# Patient Record
Sex: FEMALE | Race: WHITE | NOT HISPANIC OR LATINO
[De-identification: names, ages, dates, MRNs, and addresses within clinical notes are randomized per-mention and may not be internally consistent; named-entity substitution may affect disease eponyms.]

---

## 2021-12-28 ENCOUNTER — TRANSCRIPTION ENCOUNTER (OUTPATIENT)
Age: 51
End: 2021-12-28

## 2022-08-22 ENCOUNTER — APPOINTMENT (OUTPATIENT)
Dept: OTOLARYNGOLOGY | Facility: CLINIC | Age: 52
End: 2022-08-22

## 2022-09-01 ENCOUNTER — NON-APPOINTMENT (OUTPATIENT)
Age: 52
End: 2022-09-01

## 2022-09-05 ENCOUNTER — NON-APPOINTMENT (OUTPATIENT)
Age: 52
End: 2022-09-05

## 2022-11-30 PROBLEM — Z00.00 ENCOUNTER FOR PREVENTIVE HEALTH EXAMINATION: Status: ACTIVE | Noted: 2022-11-30

## 2022-12-09 ENCOUNTER — APPOINTMENT (OUTPATIENT)
Dept: ORTHOPEDIC SURGERY | Facility: CLINIC | Age: 52
End: 2022-12-09

## 2022-12-09 VITALS — BODY MASS INDEX: 33.99 KG/M2 | HEIGHT: 61 IN | WEIGHT: 180 LBS

## 2022-12-09 DIAGNOSIS — Z63.5 DISRUPTION OF FAMILY BY SEPARATION AND DIVORCE: ICD-10-CM

## 2022-12-09 DIAGNOSIS — Z78.9 OTHER SPECIFIED HEALTH STATUS: ICD-10-CM

## 2022-12-09 DIAGNOSIS — I10 ESSENTIAL (PRIMARY) HYPERTENSION: ICD-10-CM

## 2022-12-09 PROCEDURE — 99204 OFFICE O/P NEW MOD 45 MIN: CPT

## 2022-12-09 SDOH — SOCIAL STABILITY - SOCIAL INSECURITY: DISRUPTION OF FAMILY BY SEPARATION AND DIVORCE: Z63.5

## 2022-12-09 NOTE — PHYSICAL EXAM
[Flexion] : flexion [Extension] : extension [Normal Coordination] : normal coordination [Normal DTR UE/LE] : normal DTR UE/LE  [Normal Sensation] : normal sensation [Normal Mood and Affect] : normal mood and affect [Orientated] : orientated [Able to Communicate] : able to communicate [Normal Skin] : normal skin [No Rash] : no rash [No Ulcers] : no ulcers [No Lesions] : no lesions [No obvious lymphadenopathy in areas examined] : no obvious lymphadenopathy in areas examined [Well Developed] : well developed [Well Nourished] : well nourished [Peripheral vascular exam is grossly normal] : peripheral vascular exam is grossly normal [No Respiratory Distress] : no respiratory distress [de-identified] : Constitutional:\par - General Appearance:\par Unremarkable\par Body Habitus\par Well Developed\par Well Nourished\par Body Habitus\par No Deformities\par Well Groomed\par Ability To communicate:\par Normal\par Neurologic:\par Global sensation is intact to upper and lower extremities. See examination of Neck and/or Spine\par for exceptions.\par Orientation to Time, Place and Person is: Normal\par Mood And Affect is Normal\par Skin:\par - Head/Face, Right Upper/Lower Extremity, Left Upper/Lower Extremity: Normal\par See Examination of Neck and/or Spine for exceptions\par Cardiovascular:\par Peripheral Cardiovascular System is Normal\par Palpation of Lymph Nodes:\par Normal Palpation of lymph nodes in: Axilla, Cervical, Inguinal\par Abnormal Palpation of lymph nodes in: None  [] : non-antalgic [de-identified] : buttock pain with left SLR [TWNoteComboBox7] : forward flexion 30 degrees [de-identified] : extension 5 degrees

## 2022-12-09 NOTE — DISCUSSION/SUMMARY
[de-identified] : 51 y/o female with acute lumbar myofascial strain and chronic spondylolisthesis L5-S1. Discussed conservative treatments in the form of OTC NSAIDs, core strengthening exercises, and physical therapy. Patient was provided with a referral for lumbar physical therapy to work on stretching, strengthening and range of motion. Discussed possible interventional spine injections vs surgical decompression/fusion dependent upon her response to conservative treatments, although I am optimistic she will respond to PT and medications. 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 Patient will stay OOW. Note given. F/U in 5-6 weeks. \par \par Prior to appointment and during encounter with patient extensive medical records were reviewed including but not limited to, hospital records, outpatient records, imaging results, and lab data.During this appointment the patient was examined, diagnoses were discussed and explained in a face to face manner. In addition extensive time was spent reviewing aforementioned diagnostic studies. Counseling including abnormal image results, differential diagnoses, treatment options, risk and benefits, lifestyle changes, current condition, and current medications was performed. Patient's comments, questions, and concerns were addressed and patient verbalized understanding. Based on this patient's presentation at our office, which is an orthopedic spine surgeon's office, this patient inherently / intrinsically has a risk, however minute, of developing issues such as Cauda equina syndrome, bowel and bladder changes, or progression of motor or neurological deficits such as paralysis which may be permanent.\par \par EVITA GILBERT Acting as a Scribe for Evita Rios, attest that this documentation has been prepared under the direction and in the presence of Provider Brandt Kauffman MD.

## 2022-12-09 NOTE — HISTORY OF PRESENT ILLNESS
[Sudden] : sudden [6] : 6 [5] : 5 [Dull/Aching] : dull/aching [Radiating] : radiating [Sharp] : sharp [Shooting] : shooting [Stabbing] : stabbing [Tightness] : tightness [Constant] : constant [Heat] : heat [Not working due to injury] : Work status: not working due to injury [de-identified] : 12/09/2022 - 51 y/o female presenting for an initial evaluation of lumbar. Onset of pain approximately 1 month ago after lifting [place] tree. No hx of lumbar issues. Chief complaint is focal low back pain and b/l hip pain.  No pain, numbness/tingling, or changes in strength in the lower extremities b/l. Treating with hot compresses, no formal PT or chiropractic care. Medicine wise, she is using muscle relaxer at nighttime prescribed by PCP,  she states no long standing relief. Within the last month, she states her symptoms are mildly improved but chronic pain present daily.  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

## 2023-01-13 ENCOUNTER — APPOINTMENT (OUTPATIENT)
Dept: ORTHOPEDIC SURGERY | Facility: CLINIC | Age: 53
End: 2023-01-13
Payer: COMMERCIAL

## 2023-01-13 VITALS — WEIGHT: 180 LBS | HEIGHT: 61 IN | BODY MASS INDEX: 33.99 KG/M2

## 2023-01-13 PROCEDURE — 99213 OFFICE O/P EST LOW 20 MIN: CPT

## 2023-01-15 NOTE — PHYSICAL EXAM
[Normal Coordination] : normal coordination [Normal DTR UE/LE] : normal DTR UE/LE  [Normal Sensation] : normal sensation [Normal Mood and Affect] : normal mood and affect [Orientated] : orientated [Able to Communicate] : able to communicate [Normal Skin] : normal skin [No Rash] : no rash [No Ulcers] : no ulcers [No Lesions] : no lesions [No obvious lymphadenopathy in areas examined] : no obvious lymphadenopathy in areas examined [Well Developed] : well developed [Well Nourished] : well nourished [Peripheral vascular exam is grossly normal] : peripheral vascular exam is grossly normal [No Respiratory Distress] : no respiratory distress [de-identified] : Constitutional:\par - General Appearance:\par Unremarkable\par Body Habitus\par Well Developed\par Well Nourished\par Body Habitus\par No Deformities\par Well Groomed\par Ability To communicate:\par Normal\par Neurologic:\par Global sensation is intact to upper and lower extremities. See examination of Neck and/or Spine\par for exceptions.\par Orientation to Time, Place and Person is: Normal\par Mood And Affect is Normal\par Skin:\par - Head/Face, Right Upper/Lower Extremity, Left Upper/Lower Extremity: Normal\par See Examination of Neck and/or Spine for exceptions\par Cardiovascular:\par Peripheral Cardiovascular System is Normal\par Palpation of Lymph Nodes:\par Normal Palpation of lymph nodes in: Axilla, Cervical, Inguinal\par Abnormal Palpation of lymph nodes in: None  [] : non-antalgic [de-identified] : notes discomfort with right SLR  [TWNoteComboBox7] : False [de-identified] : False

## 2023-01-15 NOTE — HISTORY OF PRESENT ILLNESS
[4] : 4 [1] : 2 [Sharp] : sharp [Stabbing] : stabbing [Intermittent] : intermittent [Physical therapy] : physical therapy [Not working due to injury] : Work status: not working due to injury [de-identified] : 01/13/2023 - 51 y/o female presenting for a follow up of lumbar spine. She reports significant improvements since her last visit transient from PT, time, and rest. chief complaint today is focal right sided low back pain. Not symptomatic enough to use medications for pain. \par \par 12/09/2022 - 51 y/o female presenting for an initial evaluation of lumbar. Onset of pain approximately 1 month ago after lifting [place] tree. No hx of lumbar issues. Chief complaint is focal low back pain and b/l hip pain.  No pain, numbness/tingling, or changes in strength in the lower extremities b/l. Treating with hot compresses, no formal PT or chiropractic care. Medicine wise, she is using muscle relaxer at nighttime prescribed by PCP,  she states no long standing relief. Within the last month, she states her symptoms are mildly improved but chronic pain present daily.  \par \par  [] : no [FreeTextEntry1] : L-spine [de-identified] : Pt stopped PT, she reports sxs are much better and would like to return back to work.

## 2023-01-15 NOTE — DISCUSSION/SUMMARY
[de-identified] : Discussed conservative treatments in the form of OTC NSAIDs, core strengthening exercises, and physical therapy. Patient was provided with a renewal for lumbar physical therapy to work on stretching, strengthening and range of motion. I advised the patient to incorporate the exercises their teaching during PT into her daily routine. Patient will RTW on 1/16/2023, work note provided. F/U in 6 weeks. \par \par Prior to appointment and during encounter with patient extensive medical records were reviewed including but not limited to, hospital records, outpatient records, imaging results, and lab data.During this appointment the patient was examined, diagnoses were discussed and explained in a face to face manner. In addition extensive time was spent reviewing aforementioned diagnostic studies. Counseling including abnormal image results, differential diagnoses, treatment options, risk and benefits, lifestyle changes, current condition, and current medications was performed. Patient's comments, questions, and concerns were addressed and patient verbalized understanding. Based on this patient's presentation at our office, which is an orthopedic spine surgeon's office, this patient inherently / intrinsically has a risk, however minute, of developing issues such as Cauda equina syndrome, bowel and bladder changes, or progression of motor or neurological deficits such as paralysis which may be permanent.\par \par EVITA ZUNIGA Acting as a Scribe for Dr. Langley I, Evita Zuniga, attest that this documentation has been prepared under the direction and in the presence of Provider Brandt Kauffman MD.

## 2023-01-16 ENCOUNTER — FORM ENCOUNTER (OUTPATIENT)
Age: 53
End: 2023-01-16

## 2023-03-31 ENCOUNTER — APPOINTMENT (OUTPATIENT)
Dept: ORTHOPEDIC SURGERY | Facility: CLINIC | Age: 53
End: 2023-03-31
Payer: COMMERCIAL

## 2023-03-31 VITALS — WEIGHT: 180 LBS | BODY MASS INDEX: 33.99 KG/M2 | HEIGHT: 61 IN

## 2023-03-31 DIAGNOSIS — S39.012A STRAIN OF MUSCLE, FASCIA AND TENDON OF LOWER BACK, INITIAL ENCOUNTER: ICD-10-CM

## 2023-03-31 PROCEDURE — 99213 OFFICE O/P EST LOW 20 MIN: CPT

## 2023-03-31 NOTE — HISTORY OF PRESENT ILLNESS
[de-identified] : 3/31/2023-  51 y/o female presenting for a follow up of lumbar spine.  Since last visit she started working again and her lumbar back pain has been more persistent.  patient c/o right lumbar back pain with radiculopathy in to her right buttock assoc. w/ paraesthesias to b/l lower legs. \par \par 01/13/2023 - 51 y/o female presenting for a follow up of lumbar spine. She reports significant improvements since her last visit transient from PT, time, and rest. chief complaint today is focal right sided low back pain. Not symptomatic enough to use medications for pain. \par \par 12/09/2022 - 51 y/o female presenting for an initial evaluation of lumbar. Onset of pain approximately 1 month ago after lifting [place] tree. No hx of lumbar issues. Chief complaint is focal low back pain and b/l hip pain. No pain, numbness/tingling, or changes in strength in the lower extremities b/l. Treating with hot compresses, no formal PT or chiropractic care. Medicine wise, she is using muscle relaxer at nighttime prescribed by PCP, she states no long standing relief. Within the last month, she states her symptoms are mildly improved but chronic pain present daily. \par \par  \par \par Injury Details: \par Location of Problem:. L-spine. \par On a scale of 5/10\par On a scale of 7/10\par Pain Quality: sharp, stabbing. \par Pain is Radiating: no. \par Pain is intermittent. \par Pain improves with: physical therapy. \par Patient needs support to ambulate: no. \par Patient has had physical therapy for this in the past: yes. \par Treatment Since Last Visit: Pt stopped PT\par Work Details: \par Work status: not working due to injury. \par

## 2023-03-31 NOTE — HISTORY OF PRESENT ILLNESS
[de-identified] : 3/31/2023-  51 y/o female presenting for a follow up of lumbar spine.  Since last visit she started working again and her lumbar back pain has been more persistent.  patient c/o right lumbar back pain with radiculopathy in to her right buttock assoc. w/ paraesthesias to b/l lower legs. \par \par 01/13/2023 - 51 y/o female presenting for a follow up of lumbar spine. She reports significant improvements since her last visit transient from PT, time, and rest. chief complaint today is focal right sided low back pain. Not symptomatic enough to use medications for pain. \par \par 12/09/2022 - 51 y/o female presenting for an initial evaluation of lumbar. Onset of pain approximately 1 month ago after lifting [place] tree. No hx of lumbar issues. Chief complaint is focal low back pain and b/l hip pain. No pain, numbness/tingling, or changes in strength in the lower extremities b/l. Treating with hot compresses, no formal PT or chiropractic care. Medicine wise, she is using muscle relaxer at nighttime prescribed by PCP, she states no long standing relief. Within the last month, she states her symptoms are mildly improved but chronic pain present daily. \par \par  \par \par Injury Details: \par Location of Problem:. L-spine. \par On a scale of 5/10\par On a scale of 7/10\par Pain Quality: sharp, stabbing. \par Pain is Radiating: no. \par Pain is intermittent. \par Pain improves with: physical therapy. \par Patient needs support to ambulate: no. \par Patient has had physical therapy for this in the past: yes. \par Treatment Since Last Visit: Pt stopped PT\par Work Details: \par Work status: not working due to injury. \par

## 2023-03-31 NOTE — DISCUSSION/SUMMARY
[de-identified] : Patient was advised to continue HEP given she cannot get to out patient PT d/t work scheduling.  Obtain MRI lumbar spine lumbar spine to r/o HNP given her pain has returned despite conservative tx methods.  Continue OTC nsaids PRN although she prefers not to use medication based therapies.  Continue heat application.  FUV for MRI review. Discussed possible interventional spine injections after mri.\par \par Prior to appointment and during encounter with patient extensive medical records were reviewed including but not limited to, hospital records, outpatient records, imaging results, and lab data.During this appointment the patient was examined, diagnoses were discussed and explained in a face to face manner. In addition extensive time was spent reviewing aforementioned diagnostic studies. Counseling including abnormal image results, differential diagnoses, treatment options, risk and benefits, lifestyle changes, current condition, and current medications was performed. Patient's comments, questions, and concerns were addressed and patient verbalized understanding. Based on this patient's presentation at our office, which is an orthopedic spine surgeon's office, this patient inherently / intrinsically has a risk, however minute, of developing issues such as Cauda equina syndrome, bowel and bladder changes, or progression of motor or neurological deficits such as paralysis which may be permanent.\par \par Jay AGUILA, personally performed the services described in this documentation incident to Salvador Haley DO.\par

## 2023-03-31 NOTE — PHYSICAL EXAM
[Normal Coordination] : normal coordination [Normal DTR UE/LE] : normal DTR UE/LE  [Normal Sensation] : normal sensation [Normal Mood and Affect] : normal mood and affect [Orientated] : orientated [Able to Communicate] : able to communicate [Normal Skin] : normal skin [No Rash] : no rash [No Ulcers] : no ulcers [No Lesions] : no lesions [No obvious lymphadenopathy in areas examined] : no obvious lymphadenopathy in areas examined [Well Developed] : well developed [Well Nourished] : well nourished [Peripheral vascular exam is grossly normal] : peripheral vascular exam is grossly normal [No Respiratory Distress] : no respiratory distress [Flexion] : flexion [Extension] : extension [de-identified] : Constitutional:\par - General Appearance:\par Unremarkable\par Body Habitus\par Well Developed\par Well Nourished\par Body Habitus\par No Deformities\par Well Groomed\par Ability To communicate:\par Normal\par Neurologic:\par Global sensation is intact to upper and lower extremities. See examination of Neck and/or Spine\par for exceptions.\par Orientation to Time, Place and Person is: Normal\par Mood And Affect is Normal\par Skin:\par - Head/Face, Right Upper/Lower Extremity, Left Upper/Lower Extremity: Normal\par See Examination of Neck and/or Spine for exceptions\par Cardiovascular:\par Peripheral Cardiovascular System is Normal\par Palpation of Lymph Nodes:\par Normal Palpation of lymph nodes in: Axilla, Cervical, Inguinal\par Abnormal Palpation of lymph nodes in: None  [] : non-antalgic [de-identified] : notes discomfort with right SLR  [de-identified] : paraesthesia noted to bilateral calf

## 2023-03-31 NOTE — PHYSICAL EXAM
[Normal Coordination] : normal coordination [Normal DTR UE/LE] : normal DTR UE/LE  [Normal Sensation] : normal sensation [Normal Mood and Affect] : normal mood and affect [Orientated] : orientated [Able to Communicate] : able to communicate [Normal Skin] : normal skin [No Rash] : no rash [No Ulcers] : no ulcers [No Lesions] : no lesions [No obvious lymphadenopathy in areas examined] : no obvious lymphadenopathy in areas examined [Well Developed] : well developed [Well Nourished] : well nourished [Peripheral vascular exam is grossly normal] : peripheral vascular exam is grossly normal [No Respiratory Distress] : no respiratory distress [Flexion] : flexion [Extension] : extension [de-identified] : Constitutional:\par - General Appearance:\par Unremarkable\par Body Habitus\par Well Developed\par Well Nourished\par Body Habitus\par No Deformities\par Well Groomed\par Ability To communicate:\par Normal\par Neurologic:\par Global sensation is intact to upper and lower extremities. See examination of Neck and/or Spine\par for exceptions.\par Orientation to Time, Place and Person is: Normal\par Mood And Affect is Normal\par Skin:\par - Head/Face, Right Upper/Lower Extremity, Left Upper/Lower Extremity: Normal\par See Examination of Neck and/or Spine for exceptions\par Cardiovascular:\par Peripheral Cardiovascular System is Normal\par Palpation of Lymph Nodes:\par Normal Palpation of lymph nodes in: Axilla, Cervical, Inguinal\par Abnormal Palpation of lymph nodes in: None  [] : non-antalgic [de-identified] : notes discomfort with right SLR  [de-identified] : paraesthesia noted to bilateral calf

## 2023-03-31 NOTE — DISCUSSION/SUMMARY
[de-identified] : Patient was advised to continue HEP given she cannot get to out patient PT d/t work scheduling.  Obtain MRI lumbar spine lumbar spine to r/o HNP given her pain has returned despite conservative tx methods.  Continue OTC nsaids PRN although she prefers not to use medication based therapies.  Continue heat application.  FUV for MRI review. Discussed possible interventional spine injections after mri.\par \par Prior to appointment and during encounter with patient extensive medical records were reviewed including but not limited to, hospital records, outpatient records, imaging results, and lab data.During this appointment the patient was examined, diagnoses were discussed and explained in a face to face manner. In addition extensive time was spent reviewing aforementioned diagnostic studies. Counseling including abnormal image results, differential diagnoses, treatment options, risk and benefits, lifestyle changes, current condition, and current medications was performed. Patient's comments, questions, and concerns were addressed and patient verbalized understanding. Based on this patient's presentation at our office, which is an orthopedic spine surgeon's office, this patient inherently / intrinsically has a risk, however minute, of developing issues such as Cauda equina syndrome, bowel and bladder changes, or progression of motor or neurological deficits such as paralysis which may be permanent.\par \par Jay AGUILA, personally performed the services described in this documentation incident to Salvador Haley DO.\par

## 2023-04-10 ENCOUNTER — RESULT REVIEW (OUTPATIENT)
Age: 53
End: 2023-04-10

## 2023-04-19 ENCOUNTER — APPOINTMENT (OUTPATIENT)
Dept: ORTHOPEDIC SURGERY | Facility: CLINIC | Age: 53
End: 2023-04-19
Payer: COMMERCIAL

## 2023-04-19 VITALS — HEIGHT: 61 IN | WEIGHT: 180 LBS | BODY MASS INDEX: 33.99 KG/M2

## 2023-04-19 PROCEDURE — 99214 OFFICE O/P EST MOD 30 MIN: CPT

## 2023-04-23 NOTE — PHYSICAL EXAM
[Normal Coordination] : normal coordination [Normal DTR UE/LE] : normal DTR UE/LE  [Normal Sensation] : normal sensation [Normal Mood and Affect] : normal mood and affect [Orientated] : orientated [Able to Communicate] : able to communicate [Normal Skin] : normal skin [No Rash] : no rash [No Ulcers] : no ulcers [No Lesions] : no lesions [No obvious lymphadenopathy in areas examined] : no obvious lymphadenopathy in areas examined [Well Developed] : well developed [Well Nourished] : well nourished [Peripheral vascular exam is grossly normal] : peripheral vascular exam is grossly normal [No Respiratory Distress] : no respiratory distress [Flexion] : flexion [Extension] : extension [de-identified] : Constitutional:\par - General Appearance:\par Unremarkable\par Body Habitus\par Well Developed\par Well Nourished\par Body Habitus\par No Deformities\par Well Groomed\par Ability To communicate:\par Normal\par Neurologic:\par Global sensation is intact to upper and lower extremities. See examination of Neck and/or Spine\par for exceptions.\par Orientation to Time, Place and Person is: Normal\par Mood And Affect is Normal\par Skin:\par - Head/Face, Right Upper/Lower Extremity, Left Upper/Lower Extremity: Normal\par See Examination of Neck and/or Spine for exceptions\par Cardiovascular:\par Peripheral Cardiovascular System is Normal\par Palpation of Lymph Nodes:\par Normal Palpation of lymph nodes in: Axilla, Cervical, Inguinal\par Abnormal Palpation of lymph nodes in: None  [FreeTextEntry9] : ROM cervical does not reproduce tingling in the left upper extremity  [] : non-antalgic [de-identified] : notes discomfort with right SLR  [de-identified] : paraesthesia noted to bilateral calf

## 2023-04-23 NOTE — HISTORY OF PRESENT ILLNESS
[Lower back] : lower back [7] : 7 [5] : 5 [Radiating] : radiating [Dull/Aching] : dull/aching [Shooting] : shooting [Constant] : constant [Nothing helps with pain getting better] : Nothing helps with pain getting better [Bending forward] : bending forward [Full time] : Work status: full time [de-identified] : 4/19/23: Patient presenting for an MRI follow up of L Spine. She reports aggravated symptoms recently. Patient c/o low back pain, b/l buttock/hip pain, and intermittent pain into the legs. Back pain > leg pain. Taking Aleve for symptom control and sitting on heating pad. She states formal PT did provide some relief in the past, but patient is not currently enrolled. She reports aggravated symptoms at work due to persistent back pain and symptoms in the left upper extremity. \par Cervical - Also C/o numbness in the left digits and subjective weakness in the upper extremity with certain tasks - symptoms present for a few weeks. \par \par 3/31/2023-  51 y/o female presenting for a follow up of lumbar spine.  Since last visit she started working again and her lumbar back pain has been more persistent.  patient c/o right lumbar back pain with radiculopathy in to her right buttock assoc. w/ paraesthesias to b/l lower legs. \par \par 01/13/2023 - 51 y/o female presenting for a follow up of lumbar spine. She reports significant improvements since her last visit transient from PT, time, and rest. chief complaint today is focal right sided low back pain. Not symptomatic enough to use medications for pain. \par \par 12/09/2022 - 51 y/o female presenting for an initial evaluation of lumbar. Onset of pain approximately 1 month ago after lifting [place] tree. No hx of lumbar issues. Chief complaint is focal low back pain and b/l hip pain. No pain, numbness/tingling, or changes in strength in the lower extremities b/l. Treating with hot compresses, no formal PT or chiropractic care. Medicine wise, she is using muscle relaxer at nighttime prescribed by PCP, she states no long standing relief. Within the last month, she states her symptoms are mildly improved but chronic pain present daily. \par \par  \par \par Injury Details: \par Location of Problem:. L-spine. \par On a scale of 5/10\par On a scale of 7/10\par Pain Quality: sharp, stabbing. \par Pain is Radiating: no. \par Pain is intermittent. \par Pain improves with: physical therapy. \par Patient needs support to ambulate: no. \par Patient has had physical therapy for this in the past: yes. \par Treatment Since Last Visit: Pt stopped PT\par Work Details: \par Work status: not working due to injury. \par  [] : no [FreeTextEntry7] : left>right [de-identified] : MRI at   [de-identified] : p

## 2023-04-23 NOTE — DISCUSSION/SUMMARY
[de-identified] : Discussed and reviewed results of L spine MRI from 4/10/23. Discussed conservative treatments in the form of Aleve, core strengthening exercises, and spinal injections. She was provided with a referral for PM consult to discuss intracept. I discussed with the patient that if she is refractory to conservative treatments then she is a candidate for decompression/fusion. Continue to exhaust conservative management at this time. Patient was provided with a referral for lumbar physical therapy to work on stretching, strengthening and range of motion. 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 Patient will stay OOW. Note given. Cervical MRI request in the future if symptoms persist. \par \par Prior to appointment and during encounter with patient extensive medical records were reviewed including but not limited to, hospital records, outpatient records, imaging results, and lab data.During this appointment the patient was examined, diagnoses were discussed and explained in a face to face manner. In addition extensive time was spent reviewing aforementioned diagnostic studies. Counseling including abnormal image results, differential diagnoses, treatment options, risk and benefits, lifestyle changes, current condition, and current medications was performed. Patient's comments, questions, and concerns were addressed and patient verbalized understanding. Based on this patient's presentation at our office, which is an orthopedic spine surgeon's office, this patient inherently / intrinsically has a risk, however minute, of developing issues such as Cauda equina syndrome, bowel and bladder changes, or progression of motor or neurological deficits such as paralysis which may be permanent.\par \par EVITA ZUNIGA Acting as a Scribe for Dr. Langley I, Evita Zuniga, attest that this documentation has been prepared under the direction and in the presence of Provider Brandt Kauffman MD.

## 2023-05-07 ENCOUNTER — FORM ENCOUNTER (OUTPATIENT)
Age: 53
End: 2023-05-07

## 2023-06-01 ENCOUNTER — APPOINTMENT (OUTPATIENT)
Dept: PAIN MANAGEMENT | Facility: CLINIC | Age: 53
End: 2023-06-01
Payer: COMMERCIAL

## 2023-06-01 VITALS — WEIGHT: 180 LBS | BODY MASS INDEX: 33.99 KG/M2 | HEIGHT: 61 IN

## 2023-06-01 PROCEDURE — 99204 OFFICE O/P NEW MOD 45 MIN: CPT

## 2023-06-14 ENCOUNTER — APPOINTMENT (OUTPATIENT)
Dept: ORTHOPEDIC SURGERY | Facility: CLINIC | Age: 53
End: 2023-06-14
Payer: COMMERCIAL

## 2023-06-14 VITALS — HEIGHT: 61 IN | BODY MASS INDEX: 33.99 KG/M2 | WEIGHT: 180 LBS

## 2023-06-14 PROCEDURE — 99214 OFFICE O/P EST MOD 30 MIN: CPT

## 2023-06-14 NOTE — PHYSICAL EXAM
[Normal Coordination] : normal coordination [Normal DTR UE/LE] : normal DTR UE/LE  [Normal Sensation] : normal sensation [Normal Mood and Affect] : normal mood and affect [Orientated] : orientated [Able to Communicate] : able to communicate [Normal Skin] : normal skin [No Rash] : no rash [No Ulcers] : no ulcers [No Lesions] : no lesions [No obvious lymphadenopathy in areas examined] : no obvious lymphadenopathy in areas examined [Well Developed] : well developed [Well Nourished] : well nourished [Peripheral vascular exam is grossly normal] : peripheral vascular exam is grossly normal [No Respiratory Distress] : no respiratory distress [Flexion] : flexion [Extension] : extension [de-identified] : Constitutional:\par - General Appearance:\par Unremarkable\par Body Habitus\par Well Developed\par Well Nourished\par Body Habitus\par No Deformities\par Well Groomed\par Ability To communicate:\par Normal\par Neurologic:\par Global sensation is intact to upper and lower extremities. See examination of Neck and/or Spine\par for exceptions.\par Orientation to Time, Place and Person is: Normal\par Mood And Affect is Normal\par Skin:\par - Head/Face, Right Upper/Lower Extremity, Left Upper/Lower Extremity: Normal\par See Examination of Neck and/or Spine for exceptions\par Cardiovascular:\par Peripheral Cardiovascular System is Normal\par Palpation of Lymph Nodes:\par Normal Palpation of lymph nodes in: Axilla, Cervical, Inguinal\par Abnormal Palpation of lymph nodes in: None  [] : non-antalgic [de-identified] : notes discomfort with right SLR  [de-identified] : paraesthesia noted to bilateral calf

## 2023-06-14 NOTE — HISTORY OF PRESENT ILLNESS
[Lower back] : lower back [7] : 7 [5] : 5 [Dull/Aching] : dull/aching [Radiating] : radiating [Shooting] : shooting [Constant] : constant [Nothing helps with pain getting better] : Nothing helps with pain getting better [Bending forward] : bending forward [Full time] : Work status: full time [de-identified] : 6/14/2023: Patient presenting in follow up. Scheduled for LESI with Dr. Haley next week. Symptoms remain the same. Chief complaints of both mid/low back and leg pain. Back pain worse than leg pain today, but severity switches day to day. Difficulty with standing and walking. LE strength intact, no neurologic changes. Actively completing home stretching exercises which are helpful. Not actively taking meds for pain. Compared to 6 wks ago, she is slightly improved. No cervical complaints are reported today. \par \par 4/19/23: Patient presenting for an MRI follow up of L Spine. She reports aggravated symptoms recently. Patient c/o low back pain, b/l buttock/hip pain, and intermittent pain into the legs. Back pain > leg pain. Taking Aleve for symptom control and sitting on heating pad. She states formal PT did provide some relief in the past, but patient is not currently enrolled. She reports aggravated symptoms at work due to persistent back pain and symptoms in the left upper extremity. \par Cervical - Also C/o numbness in the left digits and subjective weakness in the upper extremity with certain tasks - symptoms present for a few weeks. \par \par 3/31/2023-  51 y/o female presenting for a follow up of lumbar spine.  Since last visit she started working again and her lumbar back pain has been more persistent.  patient c/o right lumbar back pain with radiculopathy in to her right buttock assoc. w/ paraesthesias to b/l lower legs. \par \par 01/13/2023 - 51 y/o female presenting for a follow up of lumbar spine. She reports significant improvements since her last visit transient from PT, time, and rest. chief complaint today is focal right sided low back pain. Not symptomatic enough to use medications for pain. \par \par 12/09/2022 - 51 y/o female presenting for an initial evaluation of lumbar. Onset of pain approximately 1 month ago after lifting [place] tree. No hx of lumbar issues. Chief complaint is focal low back pain and b/l hip pain. No pain, numbness/tingling, or changes in strength in the lower extremities b/l. Treating with hot compresses, no formal PT or chiropractic care. Medicine wise, she is using muscle relaxer at nighttime prescribed by PCP, she states no long standing relief. Within the last month, she states her symptoms are mildly improved but chronic pain present daily. \par \par  \par \par Injury Details: \par Location of Problem:. L-spine. \par On a scale of 5/10\par On a scale of 7/10\par Pain Quality: sharp, stabbing. \par Pain is Radiating: no. \par Pain is intermittent. \par Pain improves with: physical therapy. \par Patient needs support to ambulate: no. \par Patient has had physical therapy for this in the past: yes. \par Treatment Since Last Visit: Pt stopped PT\par Work Details: \par Work status: not working due to injury. \par  [] : no [FreeTextEntry7] : left>right [de-identified] : MRI at

## 2023-06-14 NOTE — DISCUSSION/SUMMARY
[de-identified] : Discussed conservative treatments in the form of Aleve PRN, core strengthening exercises, and spinal injections. Patient will follow up for scheduled L5S1 b/l TFESI with Dr. Haley next week on 6/21/23. Possible surgical candidate for decompression/fusion, however, she seems to be trending in a less symptomatic direction with non operative measures. Continue to exhaust conservative management at this time. Discussed surgical fusion as a last resort measure if refractory to non operative mgmt and symptoms are functionally incapacity. Patient was advised to work on stretching, strengthening and range of motion. F/U 4 WKS. \par \par Prior to appointment and during encounter with patient extensive medical records were reviewed including but not limited to, hospital records, outpatient records, imaging results, and lab data.During this appointment the patient was examined, diagnoses were discussed and explained in a face to face manner. In addition extensive time was spent reviewing aforementioned diagnostic studies. Counseling including abnormal image results, differential diagnoses, treatment options, risk and benefits, lifestyle changes, current condition, and current medications was performed. Patient's comments, questions, and concerns were addressed and patient verbalized understanding. Based on this patient's presentation at our office, which is an orthopedic spine surgeon's office, this patient inherently / intrinsically has a risk, however minute, of developing issues such as Cauda equina syndrome, bowel and bladder changes, or progression of motor or neurological deficits such as paralysis which may be permanent.\par \par EVITA ZUNIGA Acting as a Scribe for Dr. Korin AGUILA, Evita Zuniga, attest that this documentation has been prepared under the direction and in the presence of Provider Brandt Kauffman MD.

## 2023-06-21 ENCOUNTER — APPOINTMENT (OUTPATIENT)
Dept: PAIN MANAGEMENT | Facility: CLINIC | Age: 53
End: 2023-06-21
Payer: COMMERCIAL

## 2023-06-21 PROCEDURE — 64483 NJX AA&/STRD TFRM EPI L/S 1: CPT | Mod: 59,RT

## 2023-06-21 NOTE — PROCEDURE
[FreeTextEntry3] : Date of Service: 06/21/2023 \par \par Account: [id number]\par \par Patient: WHITNEY STRAUSS \par \par YOB: 1970\par \par Age: 53 year \par \par Surgeon:      Salvador Haley DO\par \par Assistant:    None\par \par Pre-Operative Diagnosis:         Lumbosacral Radiculopathy                 \par \par Post Operative Diagnosis:       Lumbosacral Radiculopathy               \par \par Procedure:             Bilateral L5-S1 transforaminal epidural steroid injection under fluoroscopic guidance.\par \par Anesthesia: MAC\par \par This procedure was carried out using fluoroscopic guidance.  The risks and benefits of the procedure were discussed extensively with the patient.  The consent of the patient was obtained and the following procedure was performed.   A timeout was performed with all essential staff present and the site and side were verified.\par \par The right L5-S1 neural foramen was then identified on right oblique "tayler dog" anatomical view at the 6 o' clock position using fluoroscopic guidance, and the area was marked. The overlying skin and subcutaneous structures were anesthetized using sterile technique with 1% Lidocaine.   A 22 gauge 5 inch spinal needle was directed toward the inferior (6 o'clock) position of the pedicle, which formed the roof of the identified foramen.  Once in the epidural space, after negative aspiration for heme and CSF, 1cc of Omnipaque contrast was injected to confirm epidural location and assess filling defects and rule out intravascular needle placement. \par \par Lumbar epidurogram showed no intravascular or intrathecal flow pattern.  No blood or CSF was aspirated.  Omnipaque spread medially in epidural space and outlined the exiting nerve root.  After this, 2.5 cc of a mixture of 4 cc of preservative free normal saline plus 40 mg of Kenalog was injected in the epidural space.\par \par Then attention was focused to the left L5-S1 neural foramen. This was then identified on left oblique "tayler dog" anatomical view at the 6 o' clock position using fluoroscopic guidance, and the area was marked.  The overlying skin and subcutaneous structures were anesthetized using sterile technique with 1% Lidocaine.  A 22 gauge 5 inch spinal needle was directed toward the inferior (6 o'clock) position of the pedicle, which formed the roof of the identified foramen.  Once in the epidural space, after negative aspiration for heme and CSF, 1cc of Omnipaque contrast was injected to confirm epidural location and assess filling defects and rule out intravascular needle placement. \par \par The following contrast flow observed: no intravascular or intrathecal flow pattern was noted.  No blood or CSF was aspirated. Omnipaque spread medially in epidural space.  \par \par After this, the remainder of the injectate listed above was injected in the epidural space.\par \par Vital signs remained normal throughout the procedure.  The patient tolerated the procedure well.  There were no immediate complications from the performed procedure.  The patient was instructed to apply ice over the injection sites for twenty minutes every two hours for the next 24 hours.\par \par Disposition:\par      1.  The patient was advised to F/U in 1-2 weeks to assess the response to the injection.\par      2.  The patient was also instructed to contact me immediately if there were any concerns related to the procedure performed.

## 2023-06-22 ENCOUNTER — TRANSCRIPTION ENCOUNTER (OUTPATIENT)
Age: 53
End: 2023-06-22

## 2023-06-23 NOTE — ASSESSMENT
[FreeTextEntry1] : A discussion regarding available pain management treatment options occurred with the patient.  These included interventional, rehabilitative, pharmacological, and alternative modalities. We will proceed with the following:  \par \par Interventional treatment options:  \par - Proceed with bilateral L5-S1 TFESI with fluoroscopic guidance\par - May be candidate for L4-L5, L5-S1 BVN ablation for ongoing axial low back pain; informational materials provided and portal consents signed today\par - Caution given possible hemangioma on MRI; would require clarification of radiological interpretation \par - see additional instructions below  \par \par Rehabilitative options:    \par - continue physical therapy  \par - participation in active HEP was discussed  \par \par Medication based treatment options:\par - continue OTC NSAIDs on as-needed basis\par - see additional instructions below  \par \par Complementary treatment options:  \par - Weight management and lifestyle modifications discussed   \par \par Additional treatment recommendations as follows:  \par - Follow up 1-2 weeks post injection for assessment of efficacy and further treatment recommendations\par \par The risks, benefits and alternatives of the proposed procedure were explained in detail with the patient. The risks outlined include but are not limited to infection, bleeding, post- dural puncture headache, nerve injury, a temporary increase in pain, failure to resolve symptoms, allergic reaction, and possible elevation of blood sugar in diabetics if using corticosteroid.  All questions were answered to patient's apparent satisfaction and he/she verbalized an understanding.\par \par We have discussed the risks, benefits, and alternatives NSAID therapy including but not limited to the risk of bleeding, thrombosis, gastric mucosal irritation/ulceration, allergic reaction and kidney dysfunction; the patient verbalizes an understanding.\par \par The documentation recorded by the scribe, in my presence, accurately reflects the service I personally performed and the decisions made by me with my edits as appropriate. \par \par I, Hair Jaramillo acting as scribe, attest that this documentation has been prepared under the direction and in the presence of Provider Salvador Haley DO.

## 2023-06-23 NOTE — ADDENDUM
[FreeTextEntry1] : 1) There is evidence of Modic endplate changes at the L4-L5, L5-S1 vertebral levels indicative of vertebrogenic pain.\par 2) The patient has experienced greater than 6 months of chronic low back pain which has been refractory to conservative measures

## 2023-06-23 NOTE — PHYSICAL EXAM
[de-identified] : Constitutional:  \par - No acute distress  \par - Well developed; well nourished  \par \par Neurological:  \par - normal mood and affect  \par - alert and oriented x 3   \par \par Cardiovascular:  \par - grossly normal \par \par Lumbar Spine Exam: \par \par Inspection: \par erythema (-) \par ecchymosis (-) \par rashes (-) \par alignment: no scoliosis \par \par Palpation: \par Midline lumbar tenderness:            (-) \par midline thoracic tenderness:          (-) \par Lumbar paraspinal tenderness:  L (-) ; R (-) \par thoracic paraspinal tenderness: L (-) ; R (-) \par sciatic nerve tenderness :          L (-) ; R (-) \par SI joint tenderness:                     L (-) ; R (-) \par GTB tenderness:                        L (-);  R (-) \par \par ROM: WNL \par pain with forward flexion\par \par Strength: \par                                    Right       Left    \par Hip Flexion:                (5/5)       (5/5) \par Quadriceps:               (5/5)       (5/5) \par Hamstrings:                (5/5)       (5/5) \par Ankle Dorsiflexion:     (5/5)       (5/5) \par EHL:                           (5/5)       (5/5) \par Ankle Plantarflexion:  (5/5)       (5/5) \par \par Special Tests: \par SLR:                            R (EQ) ; L (+) \par Facet loading:             R (-) ; L (-) \par DEVIN test:                R (-) ; L (-) \par Hamstring tightness:   R (-);  L (-) \par \par Neurologic: \par SILT throughout right lower extremity \par SILT throughout left lower extremity \par \par Reflexes normal and symmetric bilateral lower extremities \par \par Gait: \par Mildly antalgic gait \par ambulates without assistive device

## 2023-06-23 NOTE — HISTORY OF PRESENT ILLNESS
[Lower back] : lower back [Sudden] : sudden [5] : 5 [Dull/Aching] : dull/aching [Intermittent] : intermittent [Physical therapy] : physical therapy [Walking] : walking [FreeTextEntry1] : The patient presents for initial evaluation regarding their low back pain.   Patient was referred by Dr. Kauffman.\par Patient injured herself lifting in November, she had no history of back pain prior to the injury. Her pain is across the lower back with radicular pain to the bilateral buttocks, and down the left leg accompanied by paraesthesias in the lower left leg; her pain is exacerbated by extended periods of sitting or walking/standing. Patient has done PT, and uses Aleve PRN with minimal benefit,\par \par Subjective weakness: No \par Lower extremity paresthesias: Yes\par Bladder/bowel dysfunction: No \par \par Injections: No \par \par Pertinent Surgical History: N/A \par \par Imaging: \par \par 1) MRI Lumbar Spine (4/10/2023) - [facility]\par L1-L2: There is no disc bulge, herniation, thecal sac compression or foraminal narrowing.\par L2-L3: There is no disc bulge, herniation, thecal sac compression or foraminal narrowing.\par L3-L4: There is no disc bulge, herniation, thecal sac compression or foraminal narrowing.\par L4-L5: Left paracentral/foraminal disc herniation. 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 There is severe spondylotic change with disc space narrowing. 3 mm anterolisthesis. There is right greater than left neural foraminal stenosis\par \par Physician Disclaimer: I have personally reviewed and confirmed all HPI data with the patient.  [] : Patient is currently injured and not playing sports: no [FreeTextEntry7] : buttock, hips and legs

## 2023-07-12 ENCOUNTER — APPOINTMENT (OUTPATIENT)
Dept: ORTHOPEDIC SURGERY | Facility: CLINIC | Age: 53
End: 2023-07-12
Payer: COMMERCIAL

## 2023-07-12 VITALS — HEIGHT: 61 IN | WEIGHT: 180 LBS | BODY MASS INDEX: 33.99 KG/M2

## 2023-07-12 DIAGNOSIS — Z78.9 OTHER SPECIFIED HEALTH STATUS: ICD-10-CM

## 2023-07-12 PROCEDURE — 99214 OFFICE O/P EST MOD 30 MIN: CPT

## 2023-07-14 NOTE — PHYSICAL EXAM
[Normal Coordination] : normal coordination [Normal DTR UE/LE] : normal DTR UE/LE  [Normal Sensation] : normal sensation [Normal Mood and Affect] : normal mood and affect [Orientated] : orientated [Able to Communicate] : able to communicate [Normal Skin] : normal skin [No Rash] : no rash [No Ulcers] : no ulcers [No Lesions] : no lesions [No obvious lymphadenopathy in areas examined] : no obvious lymphadenopathy in areas examined [Well Developed] : well developed [Well Nourished] : well nourished [Peripheral vascular exam is grossly normal] : peripheral vascular exam is grossly normal [No Respiratory Distress] : no respiratory distress [Flexion] : flexion [Extension] : extension [de-identified] : Constitutional:\par - General Appearance:\par Unremarkable\par Body Habitus\par Well Developed\par Well Nourished\par Body Habitus\par No Deformities\par Well Groomed\par Ability To communicate:\par Normal\par Neurologic:\par Global sensation is intact to upper and lower extremities. See examination of Neck and/or Spine\par for exceptions.\par Orientation to Time, Place and Person is: Normal\par Mood And Affect is Normal\par Skin:\par - Head/Face, Right Upper/Lower Extremity, Left Upper/Lower Extremity: Normal\par See Examination of Neck and/or Spine for exceptions\par Cardiovascular:\par Peripheral Cardiovascular System is Normal\par Palpation of Lymph Nodes:\par Normal Palpation of lymph nodes in: Axilla, Cervical, Inguinal\par Abnormal Palpation of lymph nodes in: None  [] : non-antalgic [de-identified] : paraesthesia noted to bilateral calf

## 2023-07-14 NOTE — DISCUSSION/SUMMARY
[de-identified] : No longer standing relief after L5S1 b/l TFESI with Dr. Haley  on 6/21/23. Awaiting authorization for RFA. Continue to exhaust conservative management at this time. Discussed surgical fusion as a last resort measure if refractory to non operative mgmt and symptoms are functionally incapacitating. Surgical risks, benefits and expected outcomes have been explained to the patient. Patient was understanding and in agreement.  Patient was advised to work on stretching, strengthening and range of motion. F/U 4 WKS. \par \par Prior to appointment and during encounter with patient extensive medical records were reviewed including but not limited to, hospital records, outpatient records, imaging results, and lab data.During this appointment the patient was examined, diagnoses were discussed and explained in a face to face manner. In addition extensive time was spent reviewing aforementioned diagnostic studies. Counseling including abnormal image results, differential diagnoses, treatment options, risk and benefits, lifestyle changes, current condition, and current medications was performed. Patient's comments, questions, and concerns were addressed and patient verbalized understanding. Based on this patient's presentation at our office, which is an orthopedic spine surgeon's office, this patient inherently / intrinsically has a risk, however minute, of developing issues such as Cauda equina syndrome, bowel and bladder changes, or progression of motor or neurological deficits such as paralysis which may be permanent.\par \par EVITA GILBERT Acting as a Scribe for Evita Rios, attest that this documentation has been prepared under the direction and in the presence of Provider Brandt Kauffman MD.

## 2023-07-14 NOTE — HISTORY OF PRESENT ILLNESS
[Lower back] : lower back [7] : 7 [5] : 5 [Dull/Aching] : dull/aching [Radiating] : radiating [Shooting] : shooting [Constant] : constant [Nothing helps with pain getting better] : Nothing helps with pain getting better [Bending forward] : bending forward [Full time] : Work status: full time [de-identified] : 07/12/2023 - Patient presenting for a follow up of L Spine. Chief complaint of constant dull pain in the lower back. June 19th ULISSES provide significant relief for 1 day, since then pain has continued to progress daily. Denies leg pain, intermittent fatigue in the b/l legs. Not using meds for pain. There was no relief from Tylenol and Aleve, d/c. Pain is becoming functionally incapacitating. \par \par 6/14/2023: Patient presenting in follow up. Scheduled for LESI with Dr. Haley next week. Symptoms remain the same. Chief complaints of both mid/low back and leg pain. Back pain worse than leg pain today, but severity switches day to day. Difficulty with standing and walking. LE strength intact, no neurologic changes. Actively completing home stretching exercises which are helpful. Not actively taking meds for pain. Compared to 6 wks ago, she is slightly improved. No cervical complaints are reported today. \par \par 4/19/23: Patient presenting for an MRI follow up of L Spine. She reports aggravated symptoms recently. Patient c/o low back pain, b/l buttock/hip pain, and intermittent pain into the legs. Back pain > leg pain. Taking Aleve for symptom control and sitting on heating pad. She states formal PT did provide some relief in the past, but patient is not currently enrolled. She reports aggravated symptoms at work due to persistent back pain and symptoms in the left upper extremity. \par Cervical - Also C/o numbness in the left digits and subjective weakness in the upper extremity with certain tasks - symptoms present for a few weeks. \par \par 3/31/2023-  51 y/o female presenting for a follow up of lumbar spine.  Since last visit she started working again and her lumbar back pain has been more persistent.  patient c/o right lumbar back pain with radiculopathy in to her right buttock assoc. w/ paraesthesias to b/l lower legs. \par \par 01/13/2023 - 51 y/o female presenting for a follow up of lumbar spine. She reports significant improvements since her last visit transient from PT, time, and rest. chief complaint today is focal right sided low back pain. Not symptomatic enough to use medications for pain. \par \par 12/09/2022 - 51 y/o female presenting for an initial evaluation of lumbar. Onset of pain approximately 1 month ago after lifting [place] tree. No hx of lumbar issues. Chief complaint is focal low back pain and b/l hip pain. No pain, numbness/tingling, or changes in strength in the lower extremities b/l. Treating with hot compresses, no formal PT or chiropractic care. Medicine wise, she is using muscle relaxer at nighttime prescribed by PCP, she states no long standing relief. Within the last month, she states her symptoms are mildly improved but chronic pain present daily. \par \par  \par \par Injury Details: \par Location of Problem:. L-spine. \par On a scale of 5/10\par On a scale of 7/10\par Pain Quality: sharp, stabbing. \par Pain is Radiating: no. \par Pain is intermittent. \par Pain improves with: physical therapy. \par Patient needs support to ambulate: no. \par Patient has had physical therapy for this in the past: yes. \par Treatment Since Last Visit: Pt stopped PT\par Work Details: \par Work status: not working due to injury. \par  [] : no [FreeTextEntry7] : left>right [de-identified] : MRI at

## 2023-07-17 ENCOUNTER — APPOINTMENT (OUTPATIENT)
Dept: PAIN MANAGEMENT | Facility: CLINIC | Age: 53
End: 2023-07-17
Payer: COMMERCIAL

## 2023-07-17 VITALS — WEIGHT: 180 LBS | BODY MASS INDEX: 33.99 KG/M2 | HEIGHT: 61 IN

## 2023-07-17 DIAGNOSIS — M54.51 VERTEBROGENIC LOW BACK PAIN: ICD-10-CM

## 2023-07-17 PROCEDURE — 99214 OFFICE O/P EST MOD 30 MIN: CPT

## 2023-07-17 NOTE — PHYSICAL EXAM
[de-identified] : Constitutional:  \par - No acute distress  \par - Well developed; well nourished  \par \par Neurological:  \par - normal mood and affect  \par - alert and oriented x 3   \par \par Cardiovascular:  \par - grossly normal \par \par Lumbar Spine Exam: \par \par Inspection: \par erythema (-) \par ecchymosis (-) \par rashes (-) \par alignment: no scoliosis \par \par Palpation: \par Midline lumbar tenderness:            (-) \par midline thoracic tenderness:          (-) \par Lumbar paraspinal tenderness:  L (-) ; R (-) \par thoracic paraspinal tenderness: L (-) ; R (-) \par sciatic nerve tenderness :          L (-) ; R (-) \par SI joint tenderness:                     L (-) ; R (-) \par GTB tenderness:                        L (-);  R (-) \par \par ROM: WNL \par pain with forward flexion\par \par Strength: \par                                    Right       Left    \par Hip Flexion:                (5/5)       (5/5) \par Quadriceps:               (5/5)       (5/5) \par Hamstrings:                (5/5)       (5/5) \par Ankle Dorsiflexion:     (5/5)       (5/5) \par EHL:                           (5/5)       (5/5) \par Ankle Plantarflexion:  (5/5)       (5/5) \par \par Special Tests: \par SLR:                            R (EQ) ; L (eq) \par Facet loading:             R (-) ; L (-) \par DEVIN test:                R (-) ; L (-) \par Hamstring tightness:   R (-);  L (-) \par \par Neurologic: \par SILT throughout right lower extremity \par SILT throughout left lower extremity \par \par Reflexes normal and symmetric bilateral lower extremities \par \par Gait: \par Mildly antalgic gait \par ambulates without assistive device

## 2023-07-17 NOTE — ASSESSMENT
[FreeTextEntry1] : A discussion regarding available pain management treatment options occurred with the patient.  These included interventional, rehabilitative, pharmacological, and alternative modalities. We will proceed with the following:  \par \par Interventional treatment options:  \par - Patient is candidate for L4-L5, L5-S1 BVN ablation for ongoing axial low back pain; awaiting authorization\par - Caution given possible hemangioma on MRI; would require clarification of radiological interpretation\par - Patient has experienced greater than 6 months of low back pain despite conservative therapy\par - There is evidence of Modic endplate changes at the L4-L5, L5-S1 vertebral levels indicative of vertebrogenic pain\par - see additional instructions below  \par \par Rehabilitative options:    \par - continue physical therapy  \par - participation in active HEP was discussed  \par \par Medication based treatment options:\par - continue OTC NSAIDs on as-needed basis\par - see additional instructions below  \par \par Complementary treatment options:  \par - Weight management and lifestyle modifications discussed \par - Referral for acupuncture evaluation provided\par \par Additional treatment recommendations as follows:  \par - Follow up 2-3 months to assess response\par \par We have discussed the risks, benefits, and alternatives NSAID therapy including but not limited to the risk of bleeding, thrombosis, gastric mucosal irritation/ulceration, allergic reaction and kidney dysfunction; the patient verbalizes an understanding.\par \par The documentation recorded by the scribe, in my presence, accurately reflects the service I personally performed and the decisions made by me with my edits as appropriate. \par \par I, Jay WHEELER, personally performed the services documented under supervision of Salvador Haley DO.

## 2023-07-17 NOTE — HISTORY OF PRESENT ILLNESS
[Lower back] : lower back [Sudden] : sudden [5] : 5 [Dull/Aching] : dull/aching [Intermittent] : intermittent [Physical therapy] : physical therapy [Walking] : walking [FreeTextEntry1] : 7/17/23 - Patient presents for FUV after a bilateral L5-S1 TFESI on 6/21/2023.  Patient reports 1 day of relief from the procedure.  She continues to complain of primarily lower back pain worse with forward flexion, prolonged sitting, and prolonged standing.  NSAIDs have not given her benefit.  Inquiring about acupuncture today. \par \par 6/1/23 - The patient presents for initial evaluation regarding their low back pain.   Patient was referred by Dr. Kauffman. Patient injured herself lifting in November, she had no history of back pain prior to the injury. Her pain is across the lower back with radicular pain to the bilateral buttocks, and down the left leg accompanied by paraesthesias in the lower left leg; her pain is exacerbated by extended periods of sitting or walking/standing. Patient has done PT, and uses Aleve PRN with minimal benefit,\par \par Injections:\par 1) Bilateral L5-S1 TFESI - (6/21/2023)\par \par Pertinent Surgical History: N/A \par \par Imaging: \par 1) MRI Lumbar Spine (4/10/2023) - [facility]\par L1-L2: There is no disc bulge, herniation, thecal sac compression or foraminal narrowing.\par L2-L3: There is no disc bulge, herniation, thecal sac compression or foraminal narrowing.\par L3-L4: There is no disc bulge, herniation, thecal sac compression or foraminal narrowing.\par L4-L5: Left paracentral/foraminal disc herniation. 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 There is severe spondylotic change with disc space narrowing. 3 mm anterolisthesis. There is right greater than left neural foraminal stenosis\par \par Physician Disclaimer: I have personally reviewed and confirmed all HPI data with the patient.  [] : Patient is currently injured and not playing sports: no [FreeTextEntry7] : buttock, hips and legs

## 2023-08-23 ENCOUNTER — APPOINTMENT (OUTPATIENT)
Dept: ORTHOPEDIC SURGERY | Facility: CLINIC | Age: 53
End: 2023-08-23
Payer: COMMERCIAL

## 2023-08-23 VITALS — HEIGHT: 61 IN | WEIGHT: 180 LBS | BODY MASS INDEX: 33.99 KG/M2

## 2023-08-23 PROCEDURE — 99214 OFFICE O/P EST MOD 30 MIN: CPT

## 2023-08-27 NOTE — PHYSICAL EXAM
[Flexion] : flexion [Extension] : extension [Normal Coordination] : normal coordination [Normal DTR UE/LE] : normal DTR UE/LE  [Normal Sensation] : normal sensation [Normal Mood and Affect] : normal mood and affect [Orientated] : orientated [Able to Communicate] : able to communicate [Normal Skin] : normal skin [No Rash] : no rash [No Ulcers] : no ulcers [No Lesions] : no lesions [No obvious lymphadenopathy in areas examined] : no obvious lymphadenopathy in areas examined [Well Developed] : well developed [Peripheral vascular exam is grossly normal] : peripheral vascular exam is grossly normal [No Respiratory Distress] : no respiratory distress [] : non-antalgic [de-identified] : paraesthesia noted to bilateral calf

## 2023-08-27 NOTE — DATA REVIEWED
[MRI] : MRI [Lumbar Spine] : lumbar spine [Report was reviewed and noted in the chart] : The report was reviewed and noted in the chart [I independently reviewed and interpreted images and report] : I independently reviewed and interpreted images and report [I reviewed the films/CD and additionally noted] : I reviewed the films/CD and additionally noted [FreeTextEntry1] : I stop paperwork reviewed\par  Pain mgmt progress notes reviewed

## 2023-08-27 NOTE — DISCUSSION/SUMMARY
[de-identified] : No longer standing relief after L5S1 b/l TFESI with Dr. Haley  on 6/21/23. Awaiting authorization for RFA. Continue to exhaust conservative management at this time. Discussed surgical fusion as a last resort measure if refractory to non operative mgmt and symptoms are functionally incapacitating. She is considering. Surgical risks, benefits and expected outcomes have been explained to the patient. Patient was understanding and in agreement.  Patient was advised to work on stretching, strengthening and range of motion. F/U 4 WKS. She was given a prescription to trial a course of acupuncture.  Prior to appointment and during encounter with patient extensive medical records were reviewed including but not limited to, hospital records, outpatient records, imaging results, and lab data.During this appointment the patient was examined, diagnoses were discussed and explained in a face to face manner. In addition extensive time was spent reviewing aforementioned diagnostic studies. Counseling including abnormal image results, differential diagnoses, treatment options, risk and benefits, lifestyle changes, current condition, and current medications was performed. Patient's comments, questions, and concerns were addressed and patient verbalized understanding. Based on this patient's presentation at our office, which is an orthopedic spine surgeon's office, this patient inherently / intrinsically has a risk, however minute, of developing issues such as Cauda equina syndrome, bowel and bladder changes, or progression of motor or neurological deficits such as paralysis which may be permanent.  Saravanan Rivero Acting as a Scribe for Saravanan Abbott, attest that this documentation has been prepared under the direction and in the presence of Provider Brandt Kauffman MD.

## 2023-08-27 NOTE — HISTORY OF PRESENT ILLNESS
[Lower back] : lower back [7] : 7 [5] : 5 [Dull/Aching] : dull/aching [Radiating] : radiating [Shooting] : shooting [Constant] : constant [Nothing helps with pain getting better] : Nothing helps with pain getting better [Bending forward] : bending forward [Full time] : Work status: full time [de-identified] : 08/23/2023: Patient presenting for a follow up of L Spine. C/o of shooting pain on the left side of lower back. ADLs including prolonged sitting and bending are affected. She takes Aleve with minimal relief. She has seen pain mgmt. and received two epidural injections with no lasting relief. Epidurals helped for only one day. The isurance carrier has denied her inital request for spinal ablation and is pending another level review. C/o knee pain without radiating symptoms from the thigh.   07/12/2023 - Patient presenting for a follow up of L Spine. Chief complaint of constant dull pain in the lower back. June 19th ULISSES provide significant relief for 1 day, since then pain has continued to progress daily. Denies leg pain, intermittent fatigue in the b/l legs. Not using meds for pain. There was no relief from Tylenol and Aleve, d/c. Pain is becoming functionally incapacitating.   6/14/2023: Patient presenting in follow up. Scheduled for LESI with Dr. Haley next week. Symptoms remain the same. Chief complaints of both mid/low back and leg pain. Back pain worse than leg pain today, but severity switches day to day. Difficulty with standing and walking. LE strength intact, no neurologic changes. Actively completing home stretching exercises which are helpful. Not actively taking meds for pain. Compared to 6 wks ago, she is slightly improved. No cervical complaints are reported today.   4/19/23: Patient presenting for an MRI follow up of L Spine. She reports aggravated symptoms recently. Patient c/o low back pain, b/l buttock/hip pain, and intermittent pain into the legs. Back pain > leg pain. Taking Aleve for symptom control and sitting on heating pad. She states formal PT did provide some relief in the past, but patient is not currently enrolled. She reports aggravated symptoms at work due to persistent back pain and symptoms in the left upper extremity.  Cervical - Also C/o numbness in the left digits and subjective weakness in the upper extremity with certain tasks - symptoms present for a few weeks.   3/31/2023-  53 y/o female presenting for a follow up of lumbar spine.  Since last visit she started working again and her lumbar back pain has been more persistent.  patient c/o right lumbar back pain with radiculopathy in to her right buttock assoc. w/ paraesthesias to b/l lower legs.   01/13/2023 - 53 y/o female presenting for a follow up of lumbar spine. She reports significant improvements since her last visit transient from PT, time, and rest. chief complaint today is focal right sided low back pain. Not symptomatic enough to use medications for pain.   12/09/2022 - 53 y/o female presenting for an initial evaluation of lumbar. Onset of pain approximately 1 month ago after lifting [place] tree. No hx of lumbar issues. Chief complaint is focal low back pain and b/l hip pain. No pain, numbness/tingling, or changes in strength in the lower extremities b/l. Treating with hot compresses, no formal PT or chiropractic care. Medicine wise, she is using muscle relaxer at nighttime prescribed by PCP, she states no long standing relief. Within the last month, she states her symptoms are mildly improved but chronic pain present daily.      Injury Details:  Location of Problem:. L-spine.  On a scale of 5/10 On a scale of 7/10 Pain Quality: sharp, stabbing.  Pain is Radiating: no.  Pain is intermittent.  Pain improves with: physical therapy.  Patient needs support to ambulate: no.  Patient has had physical therapy for this in the past: yes.  Treatment Since Last Visit: Pt stopped PT Work Details:  Work status: not working due to injury.   [] : no [FreeTextEntry7] : left>right [de-identified] : MRI at

## 2023-09-06 ENCOUNTER — APPOINTMENT (OUTPATIENT)
Dept: ORTHOPEDIC SURGERY | Facility: CLINIC | Age: 53
End: 2023-09-06

## 2023-09-07 ENCOUNTER — APPOINTMENT (OUTPATIENT)
Dept: PHYSICAL MEDICINE AND REHAB | Facility: CLINIC | Age: 53
End: 2023-09-07

## 2023-10-04 ENCOUNTER — APPOINTMENT (OUTPATIENT)
Dept: ORTHOPEDIC SURGERY | Facility: CLINIC | Age: 53
End: 2023-10-04
Payer: COMMERCIAL

## 2023-10-04 VITALS — HEIGHT: 61 IN | WEIGHT: 180 LBS | BODY MASS INDEX: 33.99 KG/M2

## 2023-10-04 PROCEDURE — 99214 OFFICE O/P EST MOD 30 MIN: CPT

## 2023-10-04 RX ORDER — NAPROXEN 500 MG/1
500 TABLET ORAL TWICE DAILY
Qty: 60 | Refills: 1 | Status: ACTIVE | COMMUNITY
Start: 2023-10-04 | End: 1900-01-01

## 2023-10-12 ENCOUNTER — APPOINTMENT (OUTPATIENT)
Dept: PAIN MANAGEMENT | Facility: CLINIC | Age: 53
End: 2023-10-12

## 2023-11-29 ENCOUNTER — APPOINTMENT (OUTPATIENT)
Dept: ORTHOPEDIC SURGERY | Facility: CLINIC | Age: 53
End: 2023-11-29

## 2024-01-17 ENCOUNTER — APPOINTMENT (OUTPATIENT)
Dept: ORTHOPEDIC SURGERY | Facility: CLINIC | Age: 54
End: 2024-01-17
Payer: COMMERCIAL

## 2024-01-17 VITALS — HEIGHT: 61 IN | WEIGHT: 157 LBS | BODY MASS INDEX: 29.64 KG/M2

## 2024-01-17 DIAGNOSIS — M51.36 OTHER INTERVERTEBRAL DISC DEGENERATION, LUMBAR REGION: ICD-10-CM

## 2024-01-17 PROCEDURE — 99214 OFFICE O/P EST MOD 30 MIN: CPT

## 2024-01-17 RX ORDER — AMLODIPINE BESYLATE 10 MG/1
10 TABLET ORAL
Refills: 0 | Status: ACTIVE | COMMUNITY

## 2024-01-17 RX ORDER — SEMAGLUTIDE 1.34 MG/ML
INJECTION, SOLUTION SUBCUTANEOUS
Refills: 0 | Status: ACTIVE | COMMUNITY

## 2024-01-17 RX ORDER — HYDROCHLOROTHIAZIDE 25 MG/1
25 TABLET ORAL
Refills: 0 | Status: ACTIVE | COMMUNITY

## 2024-01-17 RX ORDER — LOSARTAN POTASSIUM 50 MG/1
50 TABLET, FILM COATED ORAL
Refills: 0 | Status: ACTIVE | COMMUNITY

## 2024-01-21 NOTE — PHYSICAL EXAM
[Normal Coordination] : normal coordination [Normal DTR UE/LE] : normal DTR UE/LE  [Normal Sensation] : normal sensation [Normal Mood and Affect] : normal mood and affect [Orientated] : orientated [Able to Communicate] : able to communicate [Normal Skin] : normal skin [No Rash] : no rash [No Ulcers] : no ulcers [No Lesions] : no lesions [No obvious lymphadenopathy in areas examined] : no obvious lymphadenopathy in areas examined [Well Developed] : well developed [Peripheral vascular exam is grossly normal] : peripheral vascular exam is grossly normal [No Respiratory Distress] : no respiratory distress [Flexion] : flexion [Extension] : extension [] : non-antalgic [de-identified] : paraesthesia noted to bilateral calf

## 2024-01-21 NOTE — DATA REVIEWED
[MRI] : MRI [Lumbar Spine] : lumbar spine [Report was reviewed and noted in the chart] : The report was reviewed and noted in the chart [I independently reviewed and interpreted images and report] : I independently reviewed and interpreted images and report [I reviewed the films/CD and additionally noted] : I reviewed the films/CD and additionally noted [FreeTextEntry1] : I stop paperwork reviewed Pain mgmt progress notes reviewed PT progress notes reviewed

## 2024-01-21 NOTE — HISTORY OF PRESENT ILLNESS
[Lower back] : lower back [7] : 7 [5] : 5 [Dull/Aching] : dull/aching [Radiating] : radiating [Shooting] : shooting [Constant] : constant [Nothing helps with pain getting better] : Nothing helps with pain getting better [Bending forward] : bending forward [Full time] : Work status: full time [] : no [de-identified] : 1/17/2024 - Patient presents today for a routine follow-up visit regarding the lumbar spine. Patient reports no improvement from prior visit. Patient still complains of lower back pain with additional pain noted to bilateral hips. Patient reports that she takes Naprosyn for pain management approximately once a week for severe flare-ups. She notes moderate relief with physical therapy - specifically the TENS unit. Patient states that she was not approved for the ablation procedure. She notes she lost approximately 20lbs on Ozempic which has provided some relief of her lower back pain.   10/04/2023 - The patient is a female patient who is presenting in follow-up. Her overall symptoms are tolerable, but she is still complaining of right-sided back pain that radiates up her back and hip pain. Will sometimes treat with Aleve. Complaints of pain while sleeping. She does not have any pain, numbness, or tingling in her lower extremities, and there are no changes in strength. Her insurance carrier has denied her request for acupuncture.  08/23/2023: Patient presenting for a follow up of L Spine. C/o of shooting pain on the left side of lower back. ADLs including prolonged sitting and bending are affected. She takes Aleve with minimal relief. She has seen pain mgmt. and received two epidural injections with no lasting relief. Epidurals helped for only one day. The isurance carrier has denied her inital request for spinal ablation and is pending another level review. C/o knee pain without radiating symptoms from the thigh.   07/12/2023 - Patient presenting for a follow up of L Spine. Chief complaint of constant dull pain in the lower back. June 19th ULISSES provide significant relief for 1 day, since then pain has continued to progress daily. Denies leg pain, intermittent fatigue in the b/l legs. Not using meds for pain. There was no relief from Tylenol and Aleve, d/c. Pain is becoming functionally incapacitating.   6/14/2023: Patient presenting in follow up. Scheduled for LESI with Dr. Haley next week. Symptoms remain the same. Chief complaints of both mid/low back and leg pain. Back pain worse than leg pain today, but severity switches day to day. Difficulty with standing and walking. LE strength intact, no neurologic changes. Actively completing home stretching exercises which are helpful. Not actively taking meds for pain. Compared to 6 wks ago, she is slightly improved. No cervical complaints are reported today.   4/19/23: Patient presenting for an MRI follow up of L Spine. She reports aggravated symptoms recently. Patient c/o low back pain, b/l buttock/hip pain, and intermittent pain into the legs. Back pain > leg pain. Taking Aleve for symptom control and sitting on heating pad. She states formal PT did provide some relief in the past, but patient is not currently enrolled. She reports aggravated symptoms at work due to persistent back pain and symptoms in the left upper extremity.  Cervical - Also C/o numbness in the left digits and subjective weakness in the upper extremity with certain tasks - symptoms present for a few weeks.   3/31/2023-  53 y/o female presenting for a follow up of lumbar spine.  Since last visit she started working again and her lumbar back pain has been more persistent.  patient c/o right lumbar back pain with radiculopathy in to her right buttock assoc. w/ paraesthesias to b/l lower legs.   01/13/2023 - 53 y/o female presenting for a follow up of lumbar spine. She reports significant improvements since her last visit transient from PT, time, and rest. chief complaint today is focal right sided low back pain. Not symptomatic enough to use medications for pain.   12/09/2022 - 53 y/o female presenting for an initial evaluation of lumbar. Onset of pain approximately 1 month ago after lifting [place] tree. No hx of lumbar issues. Chief complaint is focal low back pain and b/l hip pain. No pain, numbness/tingling, or changes in strength in the lower extremities b/l. Treating with hot compresses, no formal PT or chiropractic care. Medicine wise, she is using muscle relaxer at nighttime prescribed by PCP, she states no long standing relief. Within the last month, she states her symptoms are mildly improved but chronic pain present daily.   Injury Details:  Location of Problem:. L-spine.  On a scale of 5/10 On a scale of 7/10 Pain Quality: sharp, stabbing.  Pain is Radiating: no.  Pain is intermittent.  Pain improves with: physical therapy.  Patient needs support to ambulate: no.  Patient has had physical therapy for this in the past: yes.  Treatment Since Last Visit: Pt stopped PT Work Details:  Work status: not working due to injury.   [FreeTextEntry7] : left>right [de-identified] : MRI at

## 2024-01-21 NOTE — DISCUSSION/SUMMARY
[de-identified] : No longer standing relief after L5S1 b/l TFESI with Dr. Haley on 6/21/23. Awaiting authorization for RFA- denied approval once again. Continue to exhaust conservative management at this time. Patient will continue physical therapy - renewed script at today's visit and provided approval for TENS at PT. Prescribed patient a script for the TENS unit. At prior visit, discussed surgical fusion as a last resort measure if refractory to non operative mgmt and symptoms are functionally incapacitating. She is considering. Surgical risks, benefits and expected outcomes have been explained to the patient. Patient was understanding and in agreement. Prescribed Naproxen. Patient was provided with a referral for lumbar and hip physical therapy to work on stretching, strengthening and range of motion. Follow up in 6 WKS.   Prior to appointment and during encounter with patient extensive medical records were reviewed including but not limited to, hospital records, outpatient records, imaging results, and lab data. During this appointment the patient was examined, diagnoses were discussed and explained in a face to face manner. In addition, extensive time was spent reviewing aforementioned diagnostic studies. Counseling including abnormal image results, differential diagnoses, treatment options, risk and benefits, lifestyle changes, current condition, and current medications was performed. Patient's comments, questions, and concerns were addressed, and patient verbalized understanding. Based on this patient's presentation at our office, which is an orthopedic spine surgeon's office, this patient inherently / intrinsically has a risk, however minute, of developing issues such as Cauda equina syndrome, bowel and bladder changes, or progression of motor or neurological deficits such as paralysis which may be permanent.  AMANDA BRANDON Acting as a Scribe for Dr. Jamari AGUILA, Amanda Brandon, attest that this documentation has been prepared under the direction and in the presence of Provider Brandt Kauffman MD.

## 2024-03-01 ENCOUNTER — APPOINTMENT (OUTPATIENT)
Dept: ORTHOPEDIC SURGERY | Facility: CLINIC | Age: 54
End: 2024-03-01
Payer: COMMERCIAL

## 2024-03-01 VITALS — HEIGHT: 61 IN | BODY MASS INDEX: 29.83 KG/M2 | WEIGHT: 158 LBS

## 2024-03-01 DIAGNOSIS — M54.16 RADICULOPATHY, LUMBAR REGION: ICD-10-CM

## 2024-03-01 DIAGNOSIS — M43.17 SPONDYLOLISTHESIS, LUMBOSACRAL REGION: ICD-10-CM

## 2024-03-01 PROCEDURE — 99214 OFFICE O/P EST MOD 30 MIN: CPT

## 2024-03-01 NOTE — HISTORY OF PRESENT ILLNESS
[Lower back] : lower back [7] : 7 [5] : 5 [Dull/Aching] : dull/aching [Radiating] : radiating [Shooting] : shooting [Constant] : constant [Nothing helps with pain getting better] : Nothing helps with pain getting better [Bending forward] : bending forward [Full time] : Work status: full time [de-identified] : 03/01/2024 - Pt presents in follow up c/o predmoniately back pain. She describes new shock sensations throughout her back, worse with bending down and overall increase in physical activity. She has been enrolled in PT which she is unsure if she is making any progress. Has resorted to taking muscle relaxers qdpm due to the increase in her pain. She is moving forward with ablations today with external pain management.   1/17/2024 - Patient presents today for a routine follow-up visit regarding the lumbar spine. Patient reports no improvement from prior visit. Patient still complains of lower back pain with additional pain noted to bilateral hips. Patient reports that she takes Naprosyn for pain management approximately once a week for severe flare-ups. She notes moderate relief with physical therapy - specifically the TENS unit. Patient states that she was not approved for the ablation procedure. She notes she lost approximately 20lbs on Ozempic which has provided some relief of her lower back pain.   10/04/2023 - The patient is a female patient who is presenting in follow-up. Her overall symptoms are tolerable, but she is still complaining of right-sided back pain that radiates up her back and hip pain. Will sometimes treat with Aleve. Complaints of pain while sleeping. She does not have any pain, numbness, or tingling in her lower extremities, and there are no changes in strength. Her insurance carrier has denied her request for acupuncture.  08/23/2023: Patient presenting for a follow up of L Spine. C/o of shooting pain on the left side of lower back. ADLs including prolonged sitting and bending are affected. She takes Aleve with minimal relief. She has seen pain mgmt. and received two epidural injections with no lasting relief. Epidurals helped for only one day. The isurance carrier has denied her inital request for spinal ablation and is pending another level review. C/o knee pain without radiating symptoms from the thigh.   07/12/2023 - Patient presenting for a follow up of L Spine. Chief complaint of constant dull pain in the lower back. June 19th ULISSES provide significant relief for 1 day, since then pain has continued to progress daily. Denies leg pain, intermittent fatigue in the b/l legs. Not using meds for pain. There was no relief from Tylenol and Aleve, d/c. Pain is becoming functionally incapacitating.   6/14/2023: Patient presenting in follow up. Scheduled for [facility] with Dr. Haley next week. Symptoms remain the same. Chief complaints of both mid/low back and leg pain. Back pain worse than leg pain today, but severity switches day to day. Difficulty with standing and walking. LE strength intact, no neurologic changes. Actively completing home stretching exercises which are helpful. Not actively taking meds for pain. Compared to 6 wks ago, she is slightly improved. No cervical complaints are reported today.   4/19/23: Patient presenting for an MRI follow up of L Spine. She reports aggravated symptoms recently. Patient c/o low back pain, b/l buttock/hip pain, and intermittent pain into the legs. Back pain > leg pain. Taking Aleve for symptom control and sitting on heating pad. She states formal PT did provide some relief in the past, but patient is not currently enrolled. She reports aggravated symptoms at work due to persistent back pain and symptoms in the left upper extremity.  Cervical - Also C/o numbness in the left digits and subjective weakness in the upper extremity with certain tasks - symptoms present for a few weeks.   3/31/2023-  51 y/o female presenting for a follow up of lumbar spine.  Since last visit she started working again and her lumbar back pain has been more persistent.  patient c/o right lumbar back pain with radiculopathy in to her right buttock assoc. w/ paraesthesias to b/l lower legs.   01/13/2023 - 51 y/o female presenting for a follow up of lumbar spine. She reports significant improvements since her last visit transient from PT, time, and rest. chief complaint today is focal right sided low back pain. Not symptomatic enough to use medications for pain.   12/09/2022 - 51 y/o female presenting for an initial evaluation of lumbar. Onset of pain approximately 1 month ago after lifting [place] tree. No hx of lumbar issues. Chief complaint is focal low back pain and b/l hip pain. No pain, numbness/tingling, or changes in strength in the lower extremities b/l. Treating with hot compresses, no formal PT or chiropractic care. Medicine wise, she is using muscle relaxer at nighttime prescribed by PCP, she states no long standing relief. Within the last month, she states her symptoms are mildly improved but chronic pain present daily.   Injury Details:  Location of Problem:. L-spine.  On a scale of 5/10 On a scale of 7/10 Pain Quality: sharp, stabbing.  Pain is Radiating: no.  Pain is intermittent.  Pain improves with: physical therapy.  Patient needs support to ambulate: no.  Patient has had physical therapy for this in the past: yes.  Treatment Since Last Visit: Pt stopped PT Work Details:  Work status: not working due to injury.   [] : no [FreeTextEntry7] : left>right [de-identified] : MRI at

## 2024-03-01 NOTE — DISCUSSION/SUMMARY
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 Continuation of muscle relaxer as prescribed qdpm. Patient experienced no real progress with PT, she will transition into regular home exercises and stretching. At prior visit, discussed surgical fusion as a last resort measure if refractory to non operative mgmt and symptoms are functionally incapacitating. Follow up in 6 WKS.   Prior to appointment and during encounter with patient extensive medical records were reviewed including but not limited to, hospital records, outpatient records, imaging results, and lab data. During this appointment the patient was examined, diagnoses were discussed and explained in a face to face manner. In addition, extensive time was spent reviewing aforementioned diagnostic studies. Counseling including abnormal image results, differential diagnoses, treatment options, risk and benefits, lifestyle changes, current condition, and current medications was performed. Patient's comments, questions, and concerns were addressed, and patient verbalized understanding. Based on this patient's presentation at our office, which is an orthopedic spine surgeon's office, this patient inherently / intrinsically has a risk, however minute, of developing issues such as Cauda equina syndrome, bowel and bladder changes, or progression of motor or neurological deficits such as paralysis which may be permanent.  EVITA ZUNIGA Acting as a Scribe for Dr. Jamari AGUILA, Evita Zuniga, attest that this documentation has been prepared under the direction and in the presence of Provider Brandt Kauffman MD.

## 2024-03-01 NOTE — PHYSICAL EXAM
[Normal Coordination] : normal coordination [Normal DTR UE/LE] : normal DTR UE/LE  [Normal Sensation] : normal sensation [Normal Mood and Affect] : normal mood and affect [Oriented] : oriented [Able to Communicate] : able to communicate [Normal Skin] : normal skin [No Rash] : no rash [No Ulcers] : no ulcers [No obvious lymphadenopathy in areas examined] : no obvious lymphadenopathy in areas examined [No Lesions] : no lesions [Well Developed] : well developed [Peripheral vascular exam is grossly normal] : peripheral vascular exam is grossly normal [No Respiratory Distress] : no respiratory distress [Flexion] : flexion [Extension] : extension [] : negative equivocal straight leg raise [de-identified] : paraesthesia noted to bilateral calf

## 2024-04-17 ENCOUNTER — APPOINTMENT (OUTPATIENT)
Dept: ORTHOPEDIC SURGERY | Facility: CLINIC | Age: 54
End: 2024-04-17